# Patient Record
Sex: FEMALE | URBAN - METROPOLITAN AREA
[De-identification: names, ages, dates, MRNs, and addresses within clinical notes are randomized per-mention and may not be internally consistent; named-entity substitution may affect disease eponyms.]

---

## 2024-06-10 ENCOUNTER — APPOINTMENT (RX ONLY)
Dept: URBAN - METROPOLITAN AREA CLINIC 149 | Facility: CLINIC | Age: 69
Setting detail: DERMATOLOGY
End: 2024-06-10

## 2024-06-10 DIAGNOSIS — L81.4 OTHER MELANIN HYPERPIGMENTATION: ICD-10-CM

## 2024-06-10 DIAGNOSIS — Z71.89 OTHER SPECIFIED COUNSELING: ICD-10-CM

## 2024-06-10 DIAGNOSIS — L57.0 ACTINIC KERATOSIS: ICD-10-CM

## 2024-06-10 DIAGNOSIS — L82.1 OTHER SEBORRHEIC KERATOSIS: ICD-10-CM

## 2024-06-10 DIAGNOSIS — L57.8 OTHER SKIN CHANGES DUE TO CHRONIC EXPOSURE TO NONIONIZING RADIATION: ICD-10-CM | Status: WORSENING

## 2024-06-10 DIAGNOSIS — D485 NEOPLASM OF UNCERTAIN BEHAVIOR OF SKIN: ICD-10-CM | Status: INADEQUATELY CONTROLLED

## 2024-06-10 DIAGNOSIS — D69.2 OTHER NONTHROMBOCYTOPENIC PURPURA: ICD-10-CM

## 2024-06-10 DIAGNOSIS — L29.89 OTHER PRURITUS: ICD-10-CM

## 2024-06-10 PROBLEM — D48.5 NEOPLASM OF UNCERTAIN BEHAVIOR OF SKIN: Status: ACTIVE | Noted: 2024-06-10

## 2024-06-10 PROBLEM — L29.8 OTHER PRURITUS: Status: ACTIVE | Noted: 2024-06-10

## 2024-06-10 PROCEDURE — ? SHAVE REMOVAL AND DESTRUCTION

## 2024-06-10 PROCEDURE — 99204 OFFICE O/P NEW MOD 45 MIN: CPT | Mod: 25

## 2024-06-10 PROCEDURE — ? COUNSELING

## 2024-06-10 PROCEDURE — ? PATIENT SPECIFIC COUNSELING

## 2024-06-10 PROCEDURE — ? PRESCRIPTION

## 2024-06-10 PROCEDURE — 17262 DSTRJ MAL LES T/A/L 1.1-2.0: CPT

## 2024-06-10 PROCEDURE — ? TREATMENT REGIMEN

## 2024-06-10 PROCEDURE — ? DIAGNOSIS COMMENT

## 2024-06-10 PROCEDURE — ? PRESCRIPTION MEDICATION MANAGEMENT

## 2024-06-10 PROCEDURE — ? OTC TREATMENT REGIMEN

## 2024-06-10 PROCEDURE — ? EDUCATIONAL RESOURCES PROVIDED

## 2024-06-10 RX ORDER — FLUOCINONIDE 0.5 MG/G
CREAM TOPICAL
Qty: 60 | Refills: 0 | Status: ERX | COMMUNITY
Start: 2024-06-10

## 2024-06-10 RX ADMIN — FLUOCINONIDE: 0.5 CREAM TOPICAL at 00:00

## 2024-06-10 ASSESSMENT — LOCATION DETAILED DESCRIPTION DERM
LOCATION DETAILED: RIGHT ANKLE
LOCATION DETAILED: RIGHT DISTAL POSTERIOR THIGH
LOCATION DETAILED: LEFT ANKLE

## 2024-06-10 ASSESSMENT — LOCATION SIMPLE DESCRIPTION DERM
LOCATION SIMPLE: RIGHT ANKLE
LOCATION SIMPLE: LEFT ANKLE
LOCATION SIMPLE: RIGHT POSTERIOR THIGH

## 2024-06-10 ASSESSMENT — LOCATION ZONE DERM: LOCATION ZONE: LEG

## 2024-06-10 NOTE — PROCEDURE: COUNSELING
Detail Level: Detailed
Patient Specific Counseling (Will Not Stick From Patient To Patient): Information Sheets given to the patient are noted here or elsewhere in this note;
Detail Level: Generalized
Detail Level: Simple

## 2024-06-10 NOTE — PROCEDURE: SHAVE REMOVAL AND DESTRUCTION
Consent: Written consent was obtained and risks were reviewed including but not limited to scarring, infection, bleeding, scabbing, incomplete removal, and allergy to anesthesia.
Anesthesia Type: 1% lidocaine with epinephrine
Bill As?: Note: Bill Malignant Destruction If Path Confirms Malignant Lesion. Only Bill As Shave Removal If Path Comes Back Benign. Do Not Bill Shave Removal On Malignant Lesions.: Malignant Destruction
Cautery Type: drysol
Wound Care: Petrolatum
Anesthesia Volume In Cc: 0
Billing Type: Third-Party Bill
Post-Care Instructions: -\\n-\\nPost op care was discussed and includes cleaning the area with soap and water and then rinsing the area with a tablespoon of table vinegar in a cup of tap water. Pat dry and then apply Polysporin ointment if available and petrolatum if not. Cover with bandage if the area is likely to get dirty or rubbed. Do this until healed or 10 days. Call if not seeming to heal after 10 days or the area looks infected. Should the patient develop any fevers, chills, bleeding, severe pain patient will contact the office immediately or go to your regular doctor, urgent care or the ER.-\\n-
Size After Destruction (Required For Destruction Billing): 1.1
Hemostasis: TCA 20%
Render Post-Care Instructions In Note?: no
Detail Level: Detailed
Number Of Curettages: 2
Dressing: dry sterile dressing
Lab: -[id number]
Notification Instructions: Patient will be notified of biopsy results. However, patient instructed to call the office if not contacted within 2 weeks.
Anesthesia Volume In Cc: 1
Was Curettage Performed?: Yes

## 2024-06-10 NOTE — PROCEDURE: PRESCRIPTION MEDICATION MANAGEMENT
Detail Level: Generalized
Initiate Treatment: Apply to all sun damaged area, arms, hands, face, legs\\n\\nThis can also help the brown spots on abdomen\\n\\nAM\\n\\nAmmonium Lactate Cream or Lotion 10 or 15 % OTC, AmLactin brand either Rapid Relief or Ultra is preferable, all-over areas where you have sun damage\\n\\nWait 1-2 minutes then apply the following.\\n\\nSunscreen Broad Spectrum UVA and UVB, SPF 30+ such as [place] Sheild\\n\\nSun protective clothing\\n\\n \\n\\nPM\\n\\nApply\\n\\nNeutrogena Ultra Gentle Face Gel Hydrator, 4% Niacinamide OTC, 5 oz, (other brands will be ok) all over areas where you have sun damage\\n\\nWait 1-2 minutes then apply\\n\\nAmmonium Lactate Cream or Lotion 10 or 15 % OTC, AmLactin brand either Rapid Relief or Ultra is preferable, all-over areas where you have sun damage\\n\\nIf you have thicker spots (this is not necessary for everyone)\\n\\nWait 1-2 minutes then apply\\n\\[id number] brand of Salicylic acid 3% or another brand to thicker spots only\\n\\nUse the above plus before applying anything else apply Fluocinonide cream AM and PM
Render In Strict Bullet Format?: No
Initiate Treatment: Apply to all sun damaged area, arms, hands, face, legs\\n\\nThis can also help the brown spots on abdomen\\n\\nAM\\n\\nAmmonium Lactate Cream or Lotion 10 or 15 % OTC, AmLactin brand either Rapid Relief or Ultra is preferable, all-over areas where you have sun damage\\n\\nWait 1-2 minutes then apply the following.\\n\\nSunscreen Broad Spectrum UVA and UVB, SPF 30+ such as [place] Sheild\\n\\nSun protective clothing\\n\\n \\n\\nPM\\n\\nApply\\n\\nNeutrogena Ultra Gentle Face Gel Hydrator, 4% Niacinamide OTC, 5 oz, (other brands will be ok) all over areas where you have sun damage\\n\\nWait 1-2 minutes then apply\\n\\nAmmonium Lactate Cream or Lotion 10 or 15 % OTC, AmLactin brand either Rapid Relief or Ultra is preferable, all-over areas where you have sun damage\\n\\nIf you have thicker spots (this is not necessary for everyone)\\n\\nWait 1-2 minutes then apply\\n\\[id number] brand of Salicylic acid 3% or another brand to thicker spots only\\n\\n \\n\\nRead Sun Damage Treatment information sheet\\n\\n \\n\\nFor ankles\\n\\nUse the above plus before applying anything else apply Fluocinonide cream AM and PM\\n\\n \\n\\nReturn 2 months\\n\\n

## 2024-08-12 ENCOUNTER — APPOINTMENT (RX ONLY)
Dept: URBAN - METROPOLITAN AREA CLINIC 149 | Facility: CLINIC | Age: 69
Setting detail: DERMATOLOGY
End: 2024-08-12

## 2024-08-12 DIAGNOSIS — L82.1 OTHER SEBORRHEIC KERATOSIS: ICD-10-CM

## 2024-08-12 DIAGNOSIS — L57.8 OTHER SKIN CHANGES DUE TO CHRONIC EXPOSURE TO NONIONIZING RADIATION: ICD-10-CM

## 2024-08-12 DIAGNOSIS — Z71.89 OTHER SPECIFIED COUNSELING: ICD-10-CM

## 2024-08-12 PROCEDURE — ? PRESCRIPTION MEDICATION MANAGEMENT

## 2024-08-12 PROCEDURE — ? COUNSELING

## 2024-08-12 PROCEDURE — 99213 OFFICE O/P EST LOW 20 MIN: CPT

## 2024-08-12 PROCEDURE — ? PRESCRIPTION

## 2024-08-12 RX ORDER — TRETIONIN 0.5 MG/G
CREAM TOPICAL
Qty: 45 | Refills: 1 | Status: ERX | COMMUNITY
Start: 2024-08-12

## 2024-08-12 RX ORDER — TRIAMCINOLONE ACETONIDE 1 MG/G
CREAM TOPICAL TWICE DAILY
Qty: 453.6 | Refills: 0 | Status: ERX | COMMUNITY
Start: 2024-08-12

## 2024-08-12 RX ADMIN — TRETIONIN: 0.5 CREAM TOPICAL at 00:00

## 2024-08-12 RX ADMIN — TRIAMCINOLONE ACETONIDE: 1 CREAM TOPICAL at 00:00

## 2024-08-12 ASSESSMENT — LOCATION SIMPLE DESCRIPTION DERM
LOCATION SIMPLE: LEFT PRETIBIAL REGION
LOCATION SIMPLE: RIGHT PRETIBIAL REGION

## 2024-08-12 ASSESSMENT — LOCATION DETAILED DESCRIPTION DERM
LOCATION DETAILED: RIGHT DISTAL PRETIBIAL REGION
LOCATION DETAILED: LEFT DISTAL PRETIBIAL REGION

## 2024-08-12 ASSESSMENT — LOCATION ZONE DERM: LOCATION ZONE: LEG

## 2024-08-12 NOTE — PROCEDURE: PRESCRIPTION MEDICATION MANAGEMENT
Render In Strict Bullet Format?: No
Detail Level: Generalized
Plan: For lower leg itch areas\\nTriamcinolone 0.1 cream Before any other cream after you bath each day\\n\\nSarna Calm & Cool with Menthol lotion OTC that gives temporary itch relief.  It can be used as often as needed.  It may sting area you have scratched raw.
Plan: For treatment of rough sun damaged skin, sunspots, age spots, and pre cancer spots.\\nApply topicals to the whole areas of concern unless it says to thicker spots only.\\nIf any of these products do not feel right or you do not have time do them, then leave it off\\nNiacinamide and Ammonium lactate are the two most important of the group, definitely try to use them.\\nAM, morning treatment is for all areas, face, arms, legsem\\nApply\\nNeutrogena Ultra Gentle Face Gel Hydrator, 4% Niacinamide OTC, 5 oz, (other brands will be ok) all over areas where you have spots or sun damage\\nWait 1-2 minutes then apply\\nAmmonium Lactate Cream or Lotion 10 or 15 % OTC, AmLactin brand either Rapid Relief or Ultra is preferable, all-over areas where you have spots or sun damage\\nWait 1-2 minutes then apply\\[id number] brand of Salicylic acid 3% or another brand to thicker spots only\\nWait 1-2 minutes then apply if needed\\nSunscreen\\nBedtime\\nApply\\nTretinoin 0.05% cream/gel Rx for face only, not legs or arms\\nThis topical is for use all over areas where you have spots or sun damage\\Robina an example of how much to use of this cream, all that is needed for the entire face is the size of a pea on the fingertip, dot it around and rub into areas where you have spots or sun damage.  At first use only Monday, Wednesday, and Friday nights.  After a month if not too dry you may use 5 nights per week or every night.  Will make you sun sensitive.  Stop if pregnant.\\n\\nNeutrogena Ultra Gentle Face Gel Hydrator, 4% Niacinamide OTC, 5 oz, (other brands will be ok) all-over areas where you have spots or sun damage for all areas\\nWait 1-2 minutes then apply\\nAmmonium Lactate Cream or Lotion 10 or 15 % OTC, AmLactin brand either Rapid Relief or Ultra is preferable, all-over areas where you have spots or sun damage, this is for arms and legs at night\\[id number] brand of Salicylic acid 3% or another brand to thicker spots only\\nWait 1-2 minutes then apply\\nBlack Castor Oil ([place] Black is a good brand) may be applied to thicker spots over Salicylic acid if both used

## 2024-08-12 NOTE — PROCEDURE: MIPS QUALITY
Quality 226: Preventive Care And Screening: Tobacco Use: Screening And Cessation Intervention: Patient screened for tobacco use, is a smoker AND received Cessation Counseling within measurement period or in the six months prior to the measurement period
Detail Level: Generalized
Quality 47: Advance Care Plan: Advance Care Planning discussed and documented; advance care plan or surrogate decision maker documented in the medical record.

## 2024-11-12 ENCOUNTER — APPOINTMENT (RX ONLY)
Dept: URBAN - METROPOLITAN AREA CLINIC 149 | Facility: CLINIC | Age: 69
Setting detail: DERMATOLOGY
End: 2024-11-12

## 2024-11-12 DIAGNOSIS — L82.1 OTHER SEBORRHEIC KERATOSIS: ICD-10-CM

## 2024-11-12 DIAGNOSIS — L57.8 OTHER SKIN CHANGES DUE TO CHRONIC EXPOSURE TO NONIONIZING RADIATION: ICD-10-CM

## 2024-11-12 DIAGNOSIS — L91.0 HYPERTROPHIC SCAR: ICD-10-CM

## 2024-11-12 DIAGNOSIS — Z71.89 OTHER SPECIFIED COUNSELING: ICD-10-CM

## 2024-11-12 PROCEDURE — 99213 OFFICE O/P EST LOW 20 MIN: CPT

## 2024-11-12 PROCEDURE — ? PRESCRIPTION

## 2024-11-12 PROCEDURE — ? COUNSELING

## 2024-11-12 PROCEDURE — ? EDUCATIONAL RESOURCES PROVIDED

## 2024-11-12 PROCEDURE — ? TREATMENT REGIMEN

## 2024-11-12 PROCEDURE — ? PRESCRIPTION MEDICATION MANAGEMENT

## 2024-11-12 RX ORDER — TRIAMCINOLONE ACETONIDE 1 MG/G
CREAM TOPICAL TWICE DAILY
Qty: 453.6 | Refills: 0 | Status: ACTIVE

## 2024-11-12 ASSESSMENT — LOCATION DETAILED DESCRIPTION DERM
LOCATION DETAILED: RIGHT DISTAL PRETIBIAL REGION
LOCATION DETAILED: LEFT LATERAL ABDOMEN
LOCATION DETAILED: LEFT DISTAL PRETIBIAL REGION

## 2024-11-12 ASSESSMENT — LOCATION ZONE DERM
LOCATION ZONE: TRUNK
LOCATION ZONE: LEG

## 2024-11-12 ASSESSMENT — LOCATION SIMPLE DESCRIPTION DERM
LOCATION SIMPLE: ABDOMEN
LOCATION SIMPLE: RIGHT PRETIBIAL REGION
LOCATION SIMPLE: LEFT PRETIBIAL REGION

## 2024-11-12 NOTE — PROCEDURE: PRESCRIPTION MEDICATION MANAGEMENT
Render In Strict Bullet Format?: No
Detail Level: Generalized
Plan: For lower leg itch areas\\nTriamcinolone 0.1 cream Before any other cream after you bath each day\\n\\nSarna Calm & Cool with Menthol lotion OTC that gives temporary itch relief.  It can be used as often as needed.  It may sting area you have scratched raw.
Plan: For treatment of rough sun damaged skin, sunspots, age spots, and pre cancer spots.\\nApply topicals to the whole areas of concern unless it says to thicker spots only.\\nIf any of these products do not feel right or you do not have time do them, then leave it off\\nNiacinamide and Ammonium lactate are the two most important of the group, definitely try to use them.\\nAM, morning treatment is for all areas, face, arms, legsem\\nApply\\nNeutrogena Ultra Gentle Face Gel Hydrator, 4% Niacinamide OTC, 5 oz, (other brands will be ok) all over areas where you have spots or sun damage\\nWait 1-2 minutes then apply\\nAmmonium Lactate Cream or Lotion 10 or 15 % OTC, AmLactin brand either Rapid Relief or Ultra is preferable, all-over areas where you have spots or sun damage\\nWait 1-2 minutes then apply\\[id number] brand of Salicylic acid 3% or another brand to thicker spots only\\nWait 1-2 minutes then apply if needed\\nSunscreen\\nBedtime\\nApply\\nTretinoin 0.05% cream/gel Rx for face only, not legs or arms\\nThis topical is for use all over areas where you have spots or sun damage\\Robina an example of how much to use of this cream, all that is needed for the entire face is the size of a pea on the fingertip, dot it around and rub into areas where you have spots or sun damage.  At first use only Monday, Wednesday, and Friday nights.  After a month if not too dry you may use 5 nights per week or every night.  Will make you sun sensitive.  Stop if pregnant.\\n\\nNeutrogena Ultra Gentle Face Gel Hydrator, 4% Niacinamide OTC, 5 oz, (other brands will be ok) all-over areas where you have spots or sun damage for all areas\\nWait 1-2 minutes then apply\\nAmmonium Lactate Cream or Lotion 10 or 15 % OTC, AmLactin brand either Rapid Relief or Ultra is preferable, all-over areas where you have spots or sun damage, this is for arms and legs at night\\[id number] brand of Salicylic acid 3% or another brand to thicker spots only\\nWait 1-2 minutes then apply\\nBlack Castor Oil ([place] Black is a good brand) may be applied to thicker spots over Salicylic acid if both used

## 2025-01-30 ENCOUNTER — APPOINTMENT (OUTPATIENT)
Dept: URBAN - METROPOLITAN AREA CLINIC 149 | Facility: CLINIC | Age: 70
Setting detail: DERMATOLOGY
End: 2025-01-30

## 2025-01-30 DIAGNOSIS — L24 IRRITANT CONTACT DERMATITIS: ICD-10-CM

## 2025-01-30 DIAGNOSIS — L82.1 OTHER SEBORRHEIC KERATOSIS: ICD-10-CM

## 2025-01-30 DIAGNOSIS — Z71.89 OTHER SPECIFIED COUNSELING: ICD-10-CM

## 2025-01-30 PROBLEM — L24.9 IRRITANT CONTACT DERMATITIS, UNSPECIFIED CAUSE: Status: ACTIVE | Noted: 2025-01-30

## 2025-01-30 PROCEDURE — ? OTC TREATMENT REGIMEN

## 2025-01-30 PROCEDURE — ? PRESCRIPTION

## 2025-01-30 PROCEDURE — ? COUNSELING

## 2025-01-30 PROCEDURE — ? PRESCRIPTION MEDICATION MANAGEMENT

## 2025-01-30 PROCEDURE — ? EDUCATIONAL RESOURCES PROVIDED

## 2025-01-30 PROCEDURE — 99213 OFFICE O/P EST LOW 20 MIN: CPT

## 2025-01-30 PROCEDURE — ? TREATMENT REGIMEN

## 2025-01-30 RX ORDER — TRIAMCINOLONE ACETONIDE 1 MG/G
CREAM TOPICAL BID
Qty: 80 | Refills: 1 | Status: ACTIVE

## 2025-01-30 RX ADMIN — TRIAMCINOLONE ACETONIDE: 1 CREAM TOPICAL at 00:00

## 2025-01-30 ASSESSMENT — LOCATION SIMPLE DESCRIPTION DERM
LOCATION SIMPLE: RIGHT PRETIBIAL REGION
LOCATION SIMPLE: LEFT PRETIBIAL REGION

## 2025-01-30 ASSESSMENT — LOCATION ZONE DERM: LOCATION ZONE: LEG

## 2025-01-30 ASSESSMENT — LOCATION DETAILED DESCRIPTION DERM
LOCATION DETAILED: LEFT DISTAL PRETIBIAL REGION
LOCATION DETAILED: RIGHT DISTAL PRETIBIAL REGION

## 2025-01-30 NOTE — PROCEDURE: COUNSELING
Detail Level: Zone
Detail Level: Generalized
Patient Specific Counseling (Will Not Stick From Patient To Patient): Information Sheets given to the patient are noted here or elsewhere in this note;

## 2025-01-30 NOTE — PROCEDURE: PRESCRIPTION MEDICATION MANAGEMENT
Render In Strict Bullet Format?: No
Initiate Treatment: TAC BID PRN
Detail Level: Generalized
Initiate Treatment: JOSH coreas